# Patient Record
Sex: FEMALE | Race: BLACK OR AFRICAN AMERICAN | Employment: FULL TIME | URBAN - METROPOLITAN AREA
[De-identification: names, ages, dates, MRNs, and addresses within clinical notes are randomized per-mention and may not be internally consistent; named-entity substitution may affect disease eponyms.]

---

## 2023-05-04 ENCOUNTER — APPOINTMENT (OUTPATIENT)
Dept: PRIMARY CARE | Facility: CLINIC | Age: 43
End: 2023-05-04
Payer: COMMERCIAL

## 2023-05-15 ENCOUNTER — OFFICE VISIT (OUTPATIENT)
Dept: PRIMARY CARE | Facility: CLINIC | Age: 43
End: 2023-05-15
Payer: COMMERCIAL

## 2023-05-15 VITALS
WEIGHT: 208 LBS | SYSTOLIC BLOOD PRESSURE: 144 MMHG | HEART RATE: 77 BPM | OXYGEN SATURATION: 95 % | RESPIRATION RATE: 17 BRPM | DIASTOLIC BLOOD PRESSURE: 91 MMHG | BODY MASS INDEX: 38.28 KG/M2 | HEIGHT: 62 IN

## 2023-05-15 DIAGNOSIS — Z00.00 HEALTHCARE MAINTENANCE: ICD-10-CM

## 2023-05-15 DIAGNOSIS — R03.0 ELEVATED BP WITHOUT DIAGNOSIS OF HYPERTENSION: ICD-10-CM

## 2023-05-15 DIAGNOSIS — F41.9 ANXIETY AND DEPRESSION: ICD-10-CM

## 2023-05-15 DIAGNOSIS — L30.9 ECZEMA, UNSPECIFIED TYPE: Primary | ICD-10-CM

## 2023-05-15 DIAGNOSIS — E01.0 THYROMEGALY: ICD-10-CM

## 2023-05-15 DIAGNOSIS — F32.A ANXIETY AND DEPRESSION: ICD-10-CM

## 2023-05-15 PROCEDURE — 99386 PREV VISIT NEW AGE 40-64: CPT | Performed by: STUDENT IN AN ORGANIZED HEALTH CARE EDUCATION/TRAINING PROGRAM

## 2023-05-15 PROCEDURE — 1036F TOBACCO NON-USER: CPT | Performed by: STUDENT IN AN ORGANIZED HEALTH CARE EDUCATION/TRAINING PROGRAM

## 2023-05-15 RX ORDER — ACETAMINOPHEN 500 MG
1 TABLET ORAL DAILY
Qty: 1 KIT | Refills: 0 | Status: SHIPPED | OUTPATIENT
Start: 2023-05-15

## 2023-05-15 RX ORDER — TRIAMCINOLONE ACETONIDE 1 MG/G
CREAM TOPICAL 2 TIMES DAILY
Qty: 15 G | Refills: 0 | Status: SHIPPED | OUTPATIENT
Start: 2023-05-15

## 2023-05-15 ASSESSMENT — ENCOUNTER SYMPTOMS
WHEEZING: 0
CONSTIPATION: 0
ACTIVITY CHANGE: 0
ABDOMINAL PAIN: 0
HEMATURIA: 0
FEVER: 0
COUGH: 0
NAUSEA: 0
DIZZINESS: 0
WEAKNESS: 0
DYSURIA: 0
SPEECH DIFFICULTY: 0
NUMBNESS: 0
SHORTNESS OF BREATH: 0
VOMITING: 0

## 2023-05-15 NOTE — PROGRESS NOTES
"Subjective   Patient ID: Rocio Jenkins is a 42 y.o. female who presents for Establish Care and Annual Exam.  HPI  Here to establish care and for complete physical exam.  Denies any concerns today  Denies any past medical or surgical history.  Allergic to penicillin-hives  Cannot give us family history as she was brought up with her grandmother  Denies any smoking or recreational drug use.  Reports occasional alcohol.      Review of Systems   Constitutional:  Negative for activity change and fever.   HENT:  Negative for congestion.    Respiratory:  Negative for cough, shortness of breath and wheezing.    Cardiovascular:  Negative for chest pain and leg swelling.   Gastrointestinal:  Negative for abdominal pain, constipation, nausea and vomiting.   Endocrine: Negative for cold intolerance.   Genitourinary:  Negative for dysuria, hematuria and urgency.   Neurological:  Negative for dizziness, speech difficulty, weakness and numbness.   Psychiatric/Behavioral:  Negative for self-injury and suicidal ideas.        Objective   Visit Vitals  BP (!) 144/91 (BP Location: Left arm, Patient Position: Sitting)   Pulse 77   Resp 17   Ht 1.575 m (5' 2\")   Wt 94.3 kg (208 lb)   SpO2 95%   BMI 38.04 kg/m²   Smoking Status Never   BSA 2.03 m²      Physical Exam  HENT:      Head: Normocephalic and atraumatic.      Right Ear: Tympanic membrane normal.      Left Ear: Tympanic membrane normal.      Nose: Nose normal.      Mouth/Throat:      Mouth: Mucous membranes are moist.   Eyes:      Extraocular Movements: Extraocular movements intact.      Conjunctiva/sclera: Conjunctivae normal.      Pupils: Pupils are equal, round, and reactive to light.   Neck:      Thyroid: Thyromegaly present.   Cardiovascular:      Rate and Rhythm: Normal rate and regular rhythm.      Pulses: Normal pulses.      Heart sounds: Normal heart sounds.   Pulmonary:      Effort: Pulmonary effort is normal.      Breath sounds: Normal breath sounds. No stridor. No " rhonchi.   Abdominal:      General: Bowel sounds are normal.      Palpations: Abdomen is soft.      Tenderness: There is no abdominal tenderness. There is no guarding or rebound.   Musculoskeletal:      Cervical back: Neck supple.   Neurological:      Mental Status: She is oriented to person, place, and time.   Psychiatric:         Mood and Affect: Mood normal.         Behavior: Behavior normal.         Assessment/Plan     Problem List Items Addressed This Visit          Circulatory    Elevated BP without diagnosis of hypertension    Relevant Medications    blood pressure monitor (Blood Pressure Kit) kit     Other Visit Diagnoses       Eczema, unspecified type    -  Primary    Relevant Medications    triamcinolone (Kenalog) 0.1 % cream    Healthcare maintenance        Relevant Orders    CBC    Lipid Panel    Comprehensive Metabolic Panel    TSH with reflex to Free T4 if abnormal    BI mammo bilateral screening tomosynthesis    Referral to Gynecology    Hepatitis B Surface Antibody    Hepatitis B Surface Antigen    Hepatitis C Antibody    HIV 1/2 Antigen/Antibody Screen with Reflex to Confirmation    Thyromegaly        Relevant Orders    TSH with reflex to Free T4 if abnormal    US thyroid    Anxiety and depression        Relevant Orders    Referral to Psychology        Overall patient is here to establish care.  Reports ongoing anxiety and depression since 1 month.  Agreeable to try CBT for now.  Denies any suicidal or homicidal ideation.  Thyromegaly-no compressive symptoms endorsed.  We will get a thyroid ultrasound and thyroid blood work  Elevated blood pressure without diagnosis of hypertension-patient reports this is the first time she has had elevated blood pressure.  Advised low-salt diet, home blood pressure monitoring.  Patient to see me in 10 to 15 days with a blood pressure log and home blood pressure device  As per health maintenance we will get routine set of labs including metabolic panel and CBC,  agreeable to get HIV and hepatitis B.  Mammogram and gynecology referral placed.  Last tetanus shot in 2014 per records    Once about results are obtained we will call the patient with abnormal results if any and discuss further evaluation and management

## 2023-05-24 ENCOUNTER — LAB (OUTPATIENT)
Dept: LAB | Facility: LAB | Age: 43
End: 2023-05-24
Payer: COMMERCIAL

## 2023-05-24 DIAGNOSIS — Z00.00 HEALTHCARE MAINTENANCE: ICD-10-CM

## 2023-05-24 DIAGNOSIS — E01.0 THYROMEGALY: ICD-10-CM

## 2023-05-24 LAB
ALANINE AMINOTRANSFERASE (SGPT) (U/L) IN SER/PLAS: 9 U/L (ref 7–45)
ALBUMIN (G/DL) IN SER/PLAS: 3.8 G/DL (ref 3.4–5)
ALKALINE PHOSPHATASE (U/L) IN SER/PLAS: 58 U/L (ref 33–110)
ANION GAP IN SER/PLAS: 13 MMOL/L (ref 10–20)
ASPARTATE AMINOTRANSFERASE (SGOT) (U/L) IN SER/PLAS: 18 U/L (ref 9–39)
BILIRUBIN TOTAL (MG/DL) IN SER/PLAS: 0.5 MG/DL (ref 0–1.2)
CALCIUM (MG/DL) IN SER/PLAS: 9.3 MG/DL (ref 8.6–10.6)
CARBON DIOXIDE, TOTAL (MMOL/L) IN SER/PLAS: 26 MMOL/L (ref 21–32)
CHLORIDE (MMOL/L) IN SER/PLAS: 103 MMOL/L (ref 98–107)
CHOLESTEROL (MG/DL) IN SER/PLAS: 178 MG/DL (ref 0–199)
CHOLESTEROL IN HDL (MG/DL) IN SER/PLAS: 53.7 MG/DL
CHOLESTEROL/HDL RATIO: 3.3
CREATININE (MG/DL) IN SER/PLAS: 0.98 MG/DL (ref 0.5–1.05)
ERYTHROCYTE DISTRIBUTION WIDTH (RATIO) BY AUTOMATED COUNT: 12.3 % (ref 11.5–14.5)
ERYTHROCYTE MEAN CORPUSCULAR HEMOGLOBIN CONCENTRATION (G/DL) BY AUTOMATED: 30.7 G/DL (ref 32–36)
ERYTHROCYTE MEAN CORPUSCULAR VOLUME (FL) BY AUTOMATED COUNT: 96 FL (ref 80–100)
ERYTHROCYTES (10*6/UL) IN BLOOD BY AUTOMATED COUNT: 3.82 X10E12/L (ref 4–5.2)
GFR FEMALE: 73 ML/MIN/1.73M2
GLUCOSE (MG/DL) IN SER/PLAS: 95 MG/DL (ref 74–99)
HEMATOCRIT (%) IN BLOOD BY AUTOMATED COUNT: 36.8 % (ref 36–46)
HEMOGLOBIN (G/DL) IN BLOOD: 11.3 G/DL (ref 12–16)
HEPATITIS B VIRUS SURFACE AB (MIU/ML) IN SERUM: <3.1 MIU/ML
HEPATITIS B VIRUS SURFACE AG PRESENCE IN SERUM: NONREACTIVE
HEPATITIS C VIRUS AB PRESENCE IN SERUM: NONREACTIVE
HIV 1/ 2 AG/AB SCREEN: NONREACTIVE
LDL: 100 MG/DL (ref 0–99)
LEUKOCYTES (10*3/UL) IN BLOOD BY AUTOMATED COUNT: 6.7 X10E9/L (ref 4.4–11.3)
NRBC (PER 100 WBCS) BY AUTOMATED COUNT: 0 /100 WBC (ref 0–0)
PLATELETS (10*3/UL) IN BLOOD AUTOMATED COUNT: 305 X10E9/L (ref 150–450)
POTASSIUM (MMOL/L) IN SER/PLAS: 3.9 MMOL/L (ref 3.5–5.3)
PROTEIN TOTAL: 7.6 G/DL (ref 6.4–8.2)
SODIUM (MMOL/L) IN SER/PLAS: 138 MMOL/L (ref 136–145)
THYROTROPIN (MIU/L) IN SER/PLAS BY DETECTION LIMIT <= 0.05 MIU/L: 2.75 MIU/L (ref 0.44–3.98)
TRIGLYCERIDE (MG/DL) IN SER/PLAS: 124 MG/DL (ref 0–149)
UREA NITROGEN (MG/DL) IN SER/PLAS: 13 MG/DL (ref 6–23)
VLDL: 25 MG/DL (ref 0–40)

## 2023-05-24 PROCEDURE — 80053 COMPREHEN METABOLIC PANEL: CPT

## 2023-05-24 PROCEDURE — 87389 HIV-1 AG W/HIV-1&-2 AB AG IA: CPT

## 2023-05-24 PROCEDURE — 85027 COMPLETE CBC AUTOMATED: CPT

## 2023-05-24 PROCEDURE — 87340 HEPATITIS B SURFACE AG IA: CPT

## 2023-05-24 PROCEDURE — 84443 ASSAY THYROID STIM HORMONE: CPT

## 2023-05-24 PROCEDURE — 86803 HEPATITIS C AB TEST: CPT

## 2023-05-24 PROCEDURE — 86706 HEP B SURFACE ANTIBODY: CPT

## 2023-05-24 PROCEDURE — 36415 COLL VENOUS BLD VENIPUNCTURE: CPT

## 2023-05-24 PROCEDURE — 80061 LIPID PANEL: CPT

## 2023-06-02 ENCOUNTER — TELEPHONE (OUTPATIENT)
Dept: PRIMARY CARE | Facility: CLINIC | Age: 43
End: 2023-06-02
Payer: COMMERCIAL

## 2023-06-02 NOTE — TELEPHONE ENCOUNTER
Result Communication    Resulted Orders   CBC   Result Value Ref Range    WBC 6.7 4.4 - 11.3 x10E9/L    nRBC 0.0 0.0 - 0.0 /100 WBC    RBC 3.82 (L) 4.00 - 5.20 x10E12/L    Hemoglobin 11.3 (L) 12.0 - 16.0 g/dL    Hematocrit 36.8 36.0 - 46.0 %    MCV 96 80 - 100 fL    MCHC 30.7 (L) 32.0 - 36.0 g/dL    Platelets 305 150 - 450 x10E9/L    RDW 12.3 11.5 - 14.5 %   Lipid Panel   Result Value Ref Range    Cholesterol 178 0 - 199 mg/dL      Comment:      .      AGE      DESIRABLE   BORDERLINE HIGH   HIGH     0-19 Y     0 - 169       170 - 199     >/= 200    20-24 Y     0 - 189       190 - 224     >/= 225         >24 Y     0 - 199       200 - 239     >/= 240   **All ranges are based on fasting samples. Specific   therapeutic targets will vary based on patient-specific   cardiac risk.  .   Pediatric guidelines reference:Pediatrics 2011, 128(S5).   Adult guidelines reference: NCEP ATPIII Guidelines,     MARIA VICTORIA 2001, 258:2486-97  .   Venipuncture immediately after or during the    administration of Metamizole may lead to falsely   low results. Testing should be performed immediately   prior to Metamizole dosing.    HDL 53.7 mg/dL      Comment:      .      AGE      VERY LOW   LOW     NORMAL    HIGH       0-19 Y       < 35   < 40     40-45     ----    20-24 Y       ----   < 40       >45     ----      >24 Y       ----   < 40     40-60      >60  .    Cholesterol/HDL Ratio 3.3       Comment:      REF VALUES  DESIRABLE  < 3.4  HIGH RISK  > 5.0     (H) 0 - 99 mg/dL      Comment:      .                           NEAR      BORD      AGE      DESIRABLE  OPTIMAL    HIGH     HIGH     VERY HIGH     0-19 Y     0 - 109     ---    110-129   >/= 130     ----    20-24 Y     0 - 119     ---    120-159   >/= 160     ----      >24 Y     0 -  99   100-129  130-159   160-189     >/=190  .    VLDL 25 0 - 40 mg/dL    Triglycerides 124 0 - 149 mg/dL      Comment:      .      AGE      DESIRABLE   BORDERLINE HIGH   HIGH     VERY HIGH   0 D-90 D    19  - 174         ----         ----        ----  91 D- 9 Y     0 -  74        75 -  99     >/= 100      ----    10-19 Y     0 -  89        90 - 129     >/= 130      ----    20-24 Y     0 - 114       115 - 149     >/= 150      ----         >24 Y     0 - 149       150 - 199    200- 499    >/= 500  .   Venipuncture immediately after or during the    administration of Metamizole may lead to falsely   low results. Testing should be performed immediately   prior to Metamizole dosing.   Comprehensive Metabolic Panel   Result Value Ref Range    Glucose 95 74 - 99 mg/dL    Sodium 138 136 - 145 mmol/L    Potassium 3.9 3.5 - 5.3 mmol/L    Chloride 103 98 - 107 mmol/L    Bicarbonate 26 21 - 32 mmol/L    Anion Gap 13 10 - 20 mmol/L    Urea Nitrogen 13 6 - 23 mg/dL    Creatinine 0.98 0.50 - 1.05 mg/dL    GFR Female 73 >90 mL/min/1.73m2      Comment:       CALCULATIONS OF ESTIMATED GFR ARE PERFORMED   USING THE 2021 CKD-EPI STUDY REFIT EQUATION   WITHOUT THE RACE VARIABLE FOR THE IDMS-TRACEABLE   CREATININE METHODS.    https://jasn.asnjournals.org/content/early/2021/09/22/ASN.919785    Calcium 9.3 8.6 - 10.6 mg/dL    Albumin 3.8 3.4 - 5.0 g/dL    Alkaline Phosphatase 58 33 - 110 U/L    Total Protein 7.6 6.4 - 8.2 g/dL    AST 18 9 - 39 U/L    Total Bilirubin 0.5 0.0 - 1.2 mg/dL    ALT (SGPT) 9 7 - 45 U/L      Comment:       Patients treated with Sulfasalazine may generate    falsely decreased results for ALT.   TSH with reflex to Free T4 if abnormal   Result Value Ref Range    TSH 2.75 0.44 - 3.98 mIU/L      Comment:       TSH testing is performed using different testing    methodology at [facility] than at other    [facility] hospitals. Direct result comparisons should    only be made within the same method.   Hepatitis B Surface Antibody   Result Value Ref Range    Hepatitis B Surface Ab <3.1 <10 mIU/mL      Comment:      INTERPRETIVE CRITERIA:  <10 mIU/mL....NONREACTIVE    >=10 mIU/mL...REACTIVE   .   Biotin  interference may cause falsely decreased results.   Patients taking a Biotin dose of up to 5 mg/day should   refrain from taking Biotin for 24 hours before sample   collection. Providers may contact their local laboratory   for further information.   Hepatitis B Surface Antigen   Result Value Ref Range    Hepatitis B Surface Ag NONREACTIVE NONREACTIVE      Comment:       Biotin interference may cause falsely decreased results.   Patients taking a Biotin dose of up to 5 mg/day should   refrain from taking Biotin for 24 hours before sample   collection. Providers may contact their local laboratory   for further information.   Hepatitis C Antibody   Result Value Ref Range    Hepatitis C Ab NONREACTIVE NONREACTIVE      Comment:       Results from patients taking biotin supplements or receiving   high-dose biotin therapy should be interpreted with caution   due to possible interference with this test. Providers may    contact their local laboratory for further information.   HIV 1/2 Antigen/Antibody Screen with Reflex to Confirmation   Result Value Ref Range    HIV 1 and 2 Screen NONREACTIVE NONREACTIVE      Comment:       HIV Ag/Ab screen is performed using the Siemens [employer] MedicalllXanodyne   HIV Ag/Ab Combo assay which detects the presence of HIV    p24 antigen as well as antibodies to HIV-1   (Group M and O) and HIV-2.  .  No laboratory evidence of HIV infection. If acute HIV infection is   suspected, consider testing for HIV RNA by PCR (viral load).       11:21 AM    CAttempted to call patient about labs;    Sent an SMS notification   Awaiting a call back

## 2023-06-21 LAB
CHLAMYDIA TRACH., AMPLIFIED: NEGATIVE
CLUE CELLS: NORMAL
N. GONORRHEA, AMPLIFIED: NEGATIVE
NUGENT SCORE: 1
TRICHOMONAS VAGINALIS: NEGATIVE
YEAST: NORMAL

## 2024-03-27 ENCOUNTER — LAB (OUTPATIENT)
Dept: LAB | Facility: LAB | Age: 44
End: 2024-03-27
Payer: COMMERCIAL

## 2024-03-27 ENCOUNTER — OFFICE VISIT (OUTPATIENT)
Dept: PRIMARY CARE | Facility: CLINIC | Age: 44
End: 2024-03-27
Payer: COMMERCIAL

## 2024-03-27 VITALS
HEIGHT: 62 IN | BODY MASS INDEX: 38.05 KG/M2 | HEART RATE: 87 BPM | SYSTOLIC BLOOD PRESSURE: 136 MMHG | DIASTOLIC BLOOD PRESSURE: 87 MMHG | WEIGHT: 206.8 LBS | OXYGEN SATURATION: 95 %

## 2024-03-27 DIAGNOSIS — D64.9 MILD ANEMIA: ICD-10-CM

## 2024-03-27 DIAGNOSIS — Z76.89 ENCOUNTER TO ESTABLISH CARE: ICD-10-CM

## 2024-03-27 DIAGNOSIS — N76.0 ACUTE VAGINITIS: ICD-10-CM

## 2024-03-27 DIAGNOSIS — R94.31 ABNORMAL EKG: ICD-10-CM

## 2024-03-27 DIAGNOSIS — Z76.89 ENCOUNTER TO ESTABLISH CARE: Primary | ICD-10-CM

## 2024-03-27 DIAGNOSIS — Z00.00 ROUTINE GENERAL MEDICAL EXAMINATION AT A HEALTH CARE FACILITY: ICD-10-CM

## 2024-03-27 DIAGNOSIS — R07.89 ATYPICAL CHEST PAIN: ICD-10-CM

## 2024-03-27 LAB
ALBUMIN SERPL BCP-MCNC: 3.9 G/DL (ref 3.4–5)
ALP SERPL-CCNC: 59 U/L (ref 33–110)
ALT SERPL W P-5'-P-CCNC: 10 U/L (ref 7–45)
ANION GAP SERPL CALC-SCNC: 11 MMOL/L (ref 10–20)
AST SERPL W P-5'-P-CCNC: 17 U/L (ref 9–39)
BILIRUB SERPL-MCNC: 0.4 MG/DL (ref 0–1.2)
BUN SERPL-MCNC: 14 MG/DL (ref 6–23)
CALCIUM SERPL-MCNC: 9.5 MG/DL (ref 8.6–10.6)
CHLORIDE SERPL-SCNC: 104 MMOL/L (ref 98–107)
CHOLEST SERPL-MCNC: 211 MG/DL (ref 0–199)
CHOLESTEROL/HDL RATIO: 4.1
CO2 SERPL-SCNC: 29 MMOL/L (ref 21–32)
CREAT SERPL-MCNC: 0.91 MG/DL (ref 0.5–1.05)
EGFRCR SERPLBLD CKD-EPI 2021: 79 ML/MIN/1.73M*2
ERYTHROCYTE [DISTWIDTH] IN BLOOD BY AUTOMATED COUNT: 12.6 % (ref 11.5–14.5)
EST. AVERAGE GLUCOSE BLD GHB EST-MCNC: 103 MG/DL
GLUCOSE SERPL-MCNC: 99 MG/DL (ref 74–99)
HBA1C MFR BLD: 5.2 %
HCT VFR BLD AUTO: 36.6 % (ref 36–46)
HDLC SERPL-MCNC: 51.9 MG/DL
HGB BLD-MCNC: 11.3 G/DL (ref 12–16)
LDLC SERPL CALC-MCNC: 128 MG/DL
MCH RBC QN AUTO: 29.7 PG (ref 26–34)
MCHC RBC AUTO-ENTMCNC: 30.9 G/DL (ref 32–36)
MCV RBC AUTO: 96 FL (ref 80–100)
NON HDL CHOLESTEROL: 159 MG/DL (ref 0–149)
NRBC BLD-RTO: 0 /100 WBCS (ref 0–0)
PLATELET # BLD AUTO: 309 X10*3/UL (ref 150–450)
POTASSIUM SERPL-SCNC: 3.8 MMOL/L (ref 3.5–5.3)
PROT SERPL-MCNC: 7.3 G/DL (ref 6.4–8.2)
RBC # BLD AUTO: 3.81 X10*6/UL (ref 4–5.2)
SODIUM SERPL-SCNC: 140 MMOL/L (ref 136–145)
TRIGL SERPL-MCNC: 157 MG/DL (ref 0–149)
TSH SERPL-ACNC: 1.43 MIU/L (ref 0.44–3.98)
VLDL: 31 MG/DL (ref 0–40)
WBC # BLD AUTO: 4.8 X10*3/UL (ref 4.4–11.3)

## 2024-03-27 PROCEDURE — 82607 VITAMIN B-12: CPT

## 2024-03-27 PROCEDURE — 83021 HEMOGLOBIN CHROMOTOGRAPHY: CPT

## 2024-03-27 PROCEDURE — 83020 HEMOGLOBIN ELECTROPHORESIS: CPT | Performed by: STUDENT IN AN ORGANIZED HEALTH CARE EDUCATION/TRAINING PROGRAM

## 2024-03-27 PROCEDURE — 83036 HEMOGLOBIN GLYCOSYLATED A1C: CPT

## 2024-03-27 PROCEDURE — 80053 COMPREHEN METABOLIC PANEL: CPT

## 2024-03-27 PROCEDURE — 85027 COMPLETE CBC AUTOMATED: CPT

## 2024-03-27 PROCEDURE — 80061 LIPID PANEL: CPT

## 2024-03-27 PROCEDURE — 93000 ELECTROCARDIOGRAM COMPLETE: CPT | Performed by: STUDENT IN AN ORGANIZED HEALTH CARE EDUCATION/TRAINING PROGRAM

## 2024-03-27 PROCEDURE — 1036F TOBACCO NON-USER: CPT | Performed by: STUDENT IN AN ORGANIZED HEALTH CARE EDUCATION/TRAINING PROGRAM

## 2024-03-27 PROCEDURE — 82746 ASSAY OF FOLIC ACID SERUM: CPT

## 2024-03-27 PROCEDURE — 83540 ASSAY OF IRON: CPT

## 2024-03-27 PROCEDURE — 36415 COLL VENOUS BLD VENIPUNCTURE: CPT

## 2024-03-27 PROCEDURE — 83550 IRON BINDING TEST: CPT

## 2024-03-27 PROCEDURE — 84443 ASSAY THYROID STIM HORMONE: CPT

## 2024-03-27 PROCEDURE — 99214 OFFICE O/P EST MOD 30 MIN: CPT | Performed by: STUDENT IN AN ORGANIZED HEALTH CARE EDUCATION/TRAINING PROGRAM

## 2024-03-27 RX ORDER — FLUCONAZOLE 150 MG/1
150 TABLET ORAL ONCE
Qty: 1 TABLET | Refills: 0 | Status: SHIPPED | OUTPATIENT
Start: 2024-03-27 | End: 2024-03-27

## 2024-03-27 NOTE — PROGRESS NOTES
"Subjective   Patient ID: Rocio Jenkins is a 43 y.o. female who presents for New Patient Visit (Establish care. Discuss chest issues from a month ago. Pt declined flu shot. ).        HPI    Est care   Pt's PMH, PSH, SH, FH , meds and allergies was obtained / reviewed and updated .    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 No recent infection   She notices an odor       Visit Vitals  /87   Pulse 87   Ht 1.575 m (5' 2\")   Wt 93.8 kg (206 lb 12.8 oz)   LMP 03/15/2024   SpO2 95%   BMI 37.82 kg/m²   Smoking Status Never   BSA 2.03 m²      Patient's last menstrual period was 03/15/2024.     Review of Systems    Constitutional : No feeling poorly / fevers/ chills / night sweats/ fatigue   Cardiovascular : No CP /Palpitations/ lower extremity edema / syncope   Respiratory : No Cough /PRIDE/Dyspnea at rest   Gastrointestinal : No abd pain / N/V  No bloody stools/ melena / constipation  Endo : No polyuria/polydipsia/ muscle weakness / sluggishness   CNS: No confusion / HA/ tingling/ numbness/ weakness of extremities  Psychiatric: No anxiety/ depression/ SI/HI    All other systems have been reviewed and are negative for complaint       Physical Exam    Constitutional : Vitals reviewed. Alert and in no distress  Cardiovascular : RRR, Normal S1, S2, No pericardial rub/ gallop, no peripheral edema   Pulmonary: No respiratory distress, CTAB   MSK : Normal gait and station , strength and tone     Neurologic : CNs 2-12 grossly intact , no obvious FNDs  Psych : A,Ox3, normal mood and affect      Assessment/Plan   Diagnoses and all orders for this visit:  Encounter to establish care  -     Hemoglobin A1C; Future  -     CBC; Future  -     Comprehensive Metabolic " Panel; Future  -     Lipid Panel; Future  -     TSH with reflex to Free T4 if abnormal; Future  Routine general medical examination at a health care facility  Acute vaginitis  -     fluconazole (Diflucan) 150 mg tablet; Take 1 tablet (150 mg) by mouth 1 time for 1 dose.  Atypical chest pain  -     ECG 12 lead (Clinic Performed)  Abnormal EKG  -     Referral to Cardiology; Future      44 y/o female presents to est care with concerns as noted in HPI     # Atypical chest pain   EKG obtained today , abnormal , cardiology referral for further eval   Cardiac risk factors discussed   FH unknown   Labs as above   Declined cardiac stress test     # Discussed vaginal candidiasis and BV   Empiric rx for vaginal candidiasis  sent   Recommended testing for definitive diagnosis   Pt to RTO for testing if having sx or see gyn         Conditions addressed and mgmt as noted above.  Pertinent labs, images/ imaging reports , chart review was done .   Age appropriate labs / labs for mgmt of chronic medical conditions ordered, further mgmt pending the results.

## 2024-03-28 DIAGNOSIS — D64.9 MILD ANEMIA: Primary | ICD-10-CM

## 2024-03-28 LAB
FOLATE SERPL-MCNC: 18.5 NG/ML
IRON SATN MFR SERPL: 17 % (ref 25–45)
IRON SERPL-MCNC: 75 UG/DL (ref 35–150)
TIBC SERPL-MCNC: 436 UG/DL (ref 240–445)
UIBC SERPL-MCNC: 361 UG/DL (ref 110–370)
VIT B12 SERPL-MCNC: 390 PG/ML (ref 211–911)

## 2024-03-29 LAB
HEMOGLOBIN A2: 2.8 % (ref 2–3.5)
HEMOGLOBIN A: 95.7 % (ref 95.8–98)
HEMOGLOBIN F: 1.5 % (ref 0–2)
HEMOGLOBIN IDENTIFICATION INTERPRETATION: NORMAL
PATH REVIEW-HGB IDENTIFICATION: ABNORMAL

## 2024-04-03 ENCOUNTER — APPOINTMENT (OUTPATIENT)
Dept: PRIMARY CARE | Facility: CLINIC | Age: 44
End: 2024-04-03
Payer: COMMERCIAL

## 2024-05-20 ENCOUNTER — APPOINTMENT (OUTPATIENT)
Dept: CARDIOLOGY | Facility: CLINIC | Age: 44
End: 2024-05-20
Payer: COMMERCIAL

## 2024-05-21 ENCOUNTER — APPOINTMENT (OUTPATIENT)
Dept: OBSTETRICS AND GYNECOLOGY | Facility: CLINIC | Age: 44
End: 2024-05-21
Payer: COMMERCIAL

## 2024-05-31 ENCOUNTER — APPOINTMENT (OUTPATIENT)
Dept: OBSTETRICS AND GYNECOLOGY | Facility: HOSPITAL | Age: 44
End: 2024-05-31
Payer: COMMERCIAL

## 2024-06-10 ENCOUNTER — APPOINTMENT (OUTPATIENT)
Dept: CARDIOLOGY | Facility: CLINIC | Age: 44
End: 2024-06-10
Payer: COMMERCIAL

## 2024-06-17 ENCOUNTER — APPOINTMENT (OUTPATIENT)
Dept: CARDIOLOGY | Facility: CLINIC | Age: 44
End: 2024-06-17
Payer: COMMERCIAL

## 2024-06-21 ENCOUNTER — APPOINTMENT (OUTPATIENT)
Dept: OBSTETRICS AND GYNECOLOGY | Facility: HOSPITAL | Age: 44
End: 2024-06-21
Payer: COMMERCIAL

## 2024-07-01 ENCOUNTER — LAB REQUISITION (OUTPATIENT)
Dept: LAB | Facility: HOSPITAL | Age: 44
End: 2024-07-01
Payer: COMMERCIAL

## 2024-07-01 DIAGNOSIS — R35.0 FREQUENCY OF MICTURITION: ICD-10-CM

## 2024-07-01 PROCEDURE — 87086 URINE CULTURE/COLONY COUNT: CPT

## 2024-07-03 ENCOUNTER — APPOINTMENT (OUTPATIENT)
Dept: CARDIOLOGY | Facility: CLINIC | Age: 44
End: 2024-07-03
Payer: COMMERCIAL

## 2024-07-03 LAB — BACTERIA UR CULT: NORMAL

## 2024-10-03 ENCOUNTER — APPOINTMENT (OUTPATIENT)
Dept: PRIMARY CARE | Facility: CLINIC | Age: 44
End: 2024-10-03
Payer: COMMERCIAL

## 2024-10-03 DIAGNOSIS — I10 PRIMARY HYPERTENSION: ICD-10-CM

## 2024-10-03 DIAGNOSIS — Z12.11 COLON CANCER SCREENING: ICD-10-CM

## 2024-10-03 DIAGNOSIS — Z12.31 VISIT FOR SCREENING MAMMOGRAM: ICD-10-CM

## 2024-10-03 DIAGNOSIS — Z00.00 ROUTINE GENERAL MEDICAL EXAMINATION AT A HEALTH CARE FACILITY: Primary | ICD-10-CM

## 2024-10-03 PROCEDURE — 3008F BODY MASS INDEX DOCD: CPT | Performed by: FAMILY MEDICINE

## 2024-10-03 PROCEDURE — 3075F SYST BP GE 130 - 139MM HG: CPT | Performed by: FAMILY MEDICINE

## 2024-10-03 PROCEDURE — 99386 PREV VISIT NEW AGE 40-64: CPT | Performed by: FAMILY MEDICINE

## 2024-10-03 PROCEDURE — 3079F DIAST BP 80-89 MM HG: CPT | Performed by: FAMILY MEDICINE

## 2024-10-03 RX ORDER — AMLODIPINE BESYLATE 5 MG/1
5 TABLET ORAL DAILY
Qty: 90 TABLET | Refills: 1 | Status: SHIPPED | OUTPATIENT
Start: 2024-10-03 | End: 2025-04-01

## 2024-10-03 ASSESSMENT — PATIENT HEALTH QUESTIONNAIRE - PHQ9
4. FEELING TIRED OR HAVING LITTLE ENERGY: SEVERAL DAYS
10. IF YOU CHECKED OFF ANY PROBLEMS, HOW DIFFICULT HAVE THESE PROBLEMS MADE IT FOR YOU TO DO YOUR WORK, TAKE CARE OF THINGS AT HOME, OR GET ALONG WITH OTHER PEOPLE: SOMEWHAT DIFFICULT
7. TROUBLE CONCENTRATING ON THINGS, SUCH AS READING THE NEWSPAPER OR WATCHING TELEVISION: SEVERAL DAYS
SUM OF ALL RESPONSES TO PHQ9 QUESTIONS 1 AND 2: 4
9. THOUGHTS THAT YOU WOULD BE BETTER OFF DEAD, OR OF HURTING YOURSELF: NOT AT ALL
2. FEELING DOWN, DEPRESSED OR HOPELESS: MORE THAN HALF THE DAYS
5. POOR APPETITE OR OVEREATING: NOT AT ALL
SUM OF ALL RESPONSES TO PHQ QUESTIONS 1-9: 7
1. LITTLE INTEREST OR PLEASURE IN DOING THINGS: MORE THAN HALF THE DAYS
6. FEELING BAD ABOUT YOURSELF - OR THAT YOU ARE A FAILURE OR HAVE LET YOURSELF OR YOUR FAMILY DOWN: NOT AT ALL
3. TROUBLE FALLING OR STAYING ASLEEP OR SLEEPING TOO MUCH: NOT AT ALL
8. MOVING OR SPEAKING SO SLOWLY THAT OTHER PEOPLE COULD HAVE NOTICED. OR THE OPPOSITE, BEING SO FIGETY OR RESTLESS THAT YOU HAVE BEEN MOVING AROUND A LOT MORE THAN USUAL: SEVERAL DAYS

## 2024-10-03 NOTE — PROGRESS NOTES
"Subjective   Patient ID: Rocio Jenkins is a 43 y.o. female who presents for New Patient Visit (Est Care/CPE) and Annual Exam.      HPI  Patint is here for CPE  Needs mammo colon screen  Wants GYN reefral  Labs done in April   Current Outpatient Medications on File Prior to Visit   Medication Sig Dispense Refill    blood pressure monitor (Blood Pressure Kit) kit 1 kit once daily. 1 kit 0    triamcinolone (Kenalog) 0.1 % cream Apply topically 2 times a day. Apply to affected area 1-2 times daily as needed. 15 g 0     No current facility-administered medications on file prior to visit.        Review of Systems   Constitutional:  Negative for chills and fever.   HENT: Negative.     Respiratory: Negative.     Cardiovascular: Negative.    Gastrointestinal: Negative.  Negative for nausea and vomiting.   Endocrine: Negative.    Genitourinary: Negative.    Musculoskeletal:  Positive for arthralgias.   Skin: Negative.  Negative for rash.   Allergic/Immunologic: Negative.    Neurological: Negative.    Hematological: Negative.    Psychiatric/Behavioral: Negative.     All other systems reviewed and are negative.      Objective   /89   Pulse 81   Resp 16   Ht 1.575 m (5' 2\")   Wt 97.7 kg (215 lb 6.4 oz)   LMP 09/27/2024   SpO2 96%   BMI 39.40 kg/m²   BSA: 2.07 meters squared  Growth percentiles: Facility age limit for growth %jyoti is 20 years. Facility age limit for growth %jyoti is 20 years.   No visits with results within 1 Week(s) from this visit.   Latest known visit with results is:   Lab Requisition on 07/01/2024   Component Date Value Ref Range Status    Urine Culture 07/01/2024 Normal genitourinary jeannie   Final      Physical Exam  Vitals and nursing note reviewed.   Constitutional:       Appearance: Normal appearance.   HENT:      Head: Normocephalic and atraumatic.      Right Ear: Tympanic membrane normal.      Left Ear: Tympanic membrane normal.      Nose: Nose normal.      Mouth/Throat:      Mouth: " Mucous membranes are moist.   Eyes:      Pupils: Pupils are equal, round, and reactive to light.   Cardiovascular:      Rate and Rhythm: Normal rate and regular rhythm.      Pulses: Normal pulses.      Heart sounds: Normal heart sounds.   Pulmonary:      Effort: Pulmonary effort is normal.      Breath sounds: Normal breath sounds.   Abdominal:      General: Abdomen is flat. Bowel sounds are normal.      Palpations: Abdomen is soft.   Musculoskeletal:         General: Tenderness present.      Cervical back: Normal range of motion and neck supple.   Skin:     General: Skin is warm and dry.      Capillary Refill: Capillary refill takes less than 2 seconds.   Neurological:      General: No focal deficit present.      Mental Status: She is alert and oriented to person, place, and time.   Psychiatric:         Mood and Affect: Mood normal.         Assessment/Plan   Problem List Items Addressed This Visit             ICD-10-CM    Routine general medical examination at a health care facility - Primary Z00.00    Visit for screening mammogram Z12.31    Relevant Orders    BI mammo bilateral screening tomosynthesis    Primary hypertension I10     What is High Blood Pressure? High blood pressure (also called hypertension) is when your blood moves through your arteries at a higher pressure than normal and that forces your heart to work harder to pump the blood.     What are the Complications of High Blood Pressure? When your blood pressure gets too high or stays high for a long time, it can cause health problems such as:    Kidney disease or kidney failure   Heart attack and heart failure   Stroke   Vision problems   Circulation problems, poor blood supply to the legs    How are the causes of High Blood Pressure? There are many different causes and your provider can help you find out what might be causing your pressure to be too high.     What are the Signs of High Blood Pressure? High blood pressure doesn’t usually have warning  signs or symptoms, so many people don’t realize they have it and that is why regular monitoring is important.     Prevention and Treatment: Often high blood pressure can be controlled with lifestyle changes and when necessary, medications. Adopting heart healthy habits can help:      Maintain a Healthy Weight   Eat a heart healthy diet full of vegetables, fruits and whole grains that are high in fiber   Be Physically Active every day   Find heart healthy ways to reduce stress and increase relaxation    Don’t use tobacco products   Limit your intake of alcohol, caffeine and salt   Monitor your blood pressure regularly: ask your provider how often   Take your medications as prescribed, even when you’re feeling well   Strt amlodipine,          Relevant Medications    amLODIPine (Norvasc) 5 mg tablet    Colon cancer screening Z12.11    Relevant Orders    Cologuard® colon cancer screening     Refer to gyn  Follow up in 2 months for BP check

## 2024-10-20 VITALS
HEIGHT: 62 IN | SYSTOLIC BLOOD PRESSURE: 138 MMHG | WEIGHT: 215.4 LBS | HEART RATE: 81 BPM | BODY MASS INDEX: 39.64 KG/M2 | DIASTOLIC BLOOD PRESSURE: 89 MMHG | RESPIRATION RATE: 16 BRPM | OXYGEN SATURATION: 96 %

## 2024-10-20 PROBLEM — Z12.11 COLON CANCER SCREENING: Status: ACTIVE | Noted: 2024-10-20

## 2024-10-20 PROBLEM — I10 PRIMARY HYPERTENSION: Status: ACTIVE | Noted: 2024-10-20

## 2024-10-20 PROBLEM — Z12.31 VISIT FOR SCREENING MAMMOGRAM: Status: ACTIVE | Noted: 2024-10-20

## 2024-10-20 PROBLEM — Z00.00 ROUTINE GENERAL MEDICAL EXAMINATION AT A HEALTH CARE FACILITY: Status: ACTIVE | Noted: 2024-10-20

## 2024-10-20 ASSESSMENT — ENCOUNTER SYMPTOMS
NAUSEA: 0
GASTROINTESTINAL NEGATIVE: 1
CHILLS: 0
VOMITING: 0
ENDOCRINE NEGATIVE: 1
NEUROLOGICAL NEGATIVE: 1
ALLERGIC/IMMUNOLOGIC NEGATIVE: 1
PSYCHIATRIC NEGATIVE: 1
RESPIRATORY NEGATIVE: 1
FEVER: 0
HEMATOLOGIC/LYMPHATIC NEGATIVE: 1
CARDIOVASCULAR NEGATIVE: 1
ARTHRALGIAS: 1

## 2024-10-20 NOTE — ASSESSMENT & PLAN NOTE
What is High Blood Pressure? High blood pressure (also called hypertension) is when your blood moves through your arteries at a higher pressure than normal and that forces your heart to work harder to pump the blood.     What are the Complications of High Blood Pressure? When your blood pressure gets too high or stays high for a long time, it can cause health problems such as:    Kidney disease or kidney failure   Heart attack and heart failure   Stroke   Vision problems   Circulation problems, poor blood supply to the legs    How are the causes of High Blood Pressure? There are many different causes and your provider can help you find out what might be causing your pressure to be too high.     What are the Signs of High Blood Pressure? High blood pressure doesn’t usually have warning signs or symptoms, so many people don’t realize they have it and that is why regular monitoring is important.     Prevention and Treatment: Often high blood pressure can be controlled with lifestyle changes and when necessary, medications. Adopting heart healthy habits can help:      Maintain a Healthy Weight   Eat a heart healthy diet full of vegetables, fruits and whole grains that are high in fiber   Be Physically Active every day   Find heart healthy ways to reduce stress and increase relaxation    Don’t use tobacco products   Limit your intake of alcohol, caffeine and salt   Monitor your blood pressure regularly: ask your provider how often   Take your medications as prescribed, even when you’re feeling well   Strt amlodipine,

## 2024-11-01 LAB — NONINV COLON CA DNA+OCC BLD SCRN STL QL: NEGATIVE

## 2024-11-12 ENCOUNTER — APPOINTMENT (OUTPATIENT)
Dept: OBSTETRICS AND GYNECOLOGY | Facility: CLINIC | Age: 44
End: 2024-11-12
Payer: COMMERCIAL

## 2024-12-27 ENCOUNTER — OFFICE VISIT (OUTPATIENT)
Dept: URGENT CARE | Age: 44
End: 2024-12-27
Payer: COMMERCIAL

## 2024-12-27 ENCOUNTER — APPOINTMENT (OUTPATIENT)
Dept: URGENT CARE | Age: 44
End: 2024-12-27

## 2024-12-27 ENCOUNTER — APPOINTMENT (OUTPATIENT)
Dept: URGENT CARE | Age: 44
End: 2024-12-27
Payer: COMMERCIAL

## 2024-12-27 VITALS
TEMPERATURE: 97.2 F | HEART RATE: 90 BPM | DIASTOLIC BLOOD PRESSURE: 82 MMHG | RESPIRATION RATE: 16 BRPM | OXYGEN SATURATION: 96 % | SYSTOLIC BLOOD PRESSURE: 142 MMHG

## 2024-12-27 DIAGNOSIS — N89.8 VAGINAL DISCHARGE: Primary | ICD-10-CM

## 2024-12-27 PROCEDURE — 87491 CHLMYD TRACH DNA AMP PROBE: CPT

## 2024-12-27 PROCEDURE — 87661 TRICHOMONAS VAGINALIS AMPLIF: CPT

## 2024-12-27 PROCEDURE — 87591 N.GONORRHOEAE DNA AMP PROB: CPT

## 2024-12-27 PROCEDURE — 87205 SMEAR GRAM STAIN: CPT

## 2024-12-27 RX ORDER — FLUCONAZOLE 150 MG/1
150 TABLET ORAL SEE ADMIN INSTRUCTIONS
Qty: 2 TABLET | Refills: 0 | Status: SHIPPED | OUTPATIENT
Start: 2024-12-27 | End: 2024-12-28

## 2024-12-27 NOTE — PROGRESS NOTES
Subjective   Patient ID: Rocio Jenkins is a 44 y.o. female. They present today with a chief complaint of Vaginal Discharge (2 days x discharge and itching ).    History of Present Illness  Rocio Jenkins is a 44 y.o. female who presents to the clinic for couple days of vaginal discharge.  And itching.  Patient states the discharge is white in color.  Patient not take anything over-the-counter for the discomfort. Pt denies any chest pain, sob, N/V at this time in clinic.             Past Medical History  Allergies as of 12/27/2024 - Reviewed 12/27/2024   Allergen Reaction Noted    Penicillins Rash 08/05/2005       (Not in a hospital admission)       Past Medical History:   Diagnosis Date    Eczema        Past Surgical History:   Procedure Laterality Date    OTHER SURGICAL HISTORY  07/11/2019    No history of surgery        reports that she has never smoked. She has never been exposed to tobacco smoke. She has never used smokeless tobacco. She reports that she does not currently use alcohol. She reports that she does not currently use drugs.    Review of Systems  Review of Systems   Genitourinary:  Positive for vaginal discharge.   All other systems reviewed and are negative.                                 Objective    Vitals:    12/27/24 1449   BP: 142/82   Pulse: 90   Resp: 16   Temp: 36.2 °C (97.2 °F)   SpO2: 96%     No LMP recorded.    Physical Exam  Constitutional:       Appearance: Normal appearance.   Abdominal:      General: Abdomen is flat. Bowel sounds are normal. There is no distension.      Palpations: Abdomen is soft. There is no mass.      Tenderness: There is no abdominal tenderness. There is no right CVA tenderness, left CVA tenderness, guarding or rebound.   Neurological:      General: No focal deficit present.      Mental Status: She is alert and oriented to person, place, and time. Mental status is at baseline.   Psychiatric:         Mood and Affect: Mood normal.         Behavior:  Behavior normal.         Procedures    Point of Care Test & Imaging Results from this visit  No results found for this visit on 12/27/24.   No results found.    Diagnostic study results (if any) were reviewed by JESSICA Chicas.    Assessment/Plan   Allergies, medications, history, and pertinent labs/EKGs/Imaging reviewed by JESSICA Chicas.     Medical Decision Making  Concern for BV versus yeast versus STI.  Will send Diflucan to pharmacy.  Patient states she gets yeast infections before her menstrual cycle each month.  Patient does not have OB/GYN.  Will refer patient to OB/GYN at this visit.  Advised patient to call referral telephone number to get appointment set.  Advised patient if worsening symptoms to go to the emergency department for further evaluation.  Patient verbalized understanding.    Orders and Diagnoses  Diagnoses and all orders for this visit:  Vaginal discharge  -     fluconazole (Diflucan) 150 mg tablet; Take 1 tablet (150 mg) by mouth see administration instructions for 1 day. Take one tab now. Repeat in 7 days if symptoms persist.  -     Referral to Obstetrics / Gynecology; Future  -     Vaginitis Gram Stain For Bacterial Vaginosis + Yeast  -     C. trachomatis / N. gonorrhoeae, Amplified  -     Trichomonas vaginalis, Amplified      Medical Admin Record      Patient disposition: Home    Electronically signed by JESSICA Chicas  3:09 PM

## 2024-12-27 NOTE — PATIENT INSTRUCTIONS
Diflucan ordered.  Send out for BV/yeast-if positive will start antibiotic therapy.  [facility] send out for gonorrhea chlamydia and trichomonas-if positive we will call and start antibiotics.  Referral for OB/GYN placed.  Please call referral line.  If worsening symptoms, please go to the emergency room for further evaluation.    Please follow up with your primary provider within one week if symptoms do not improve.  You may schedule an appointment online at [email or web address] or call [phone]. Go to the Emergency Department if symptoms significantly worsen or if you develop chest pain or shortness of breath.

## 2024-12-28 LAB
C TRACH RRNA SPEC QL NAA+PROBE: NEGATIVE
CLUE CELLS VAG LPF-#/AREA: NORMAL /[LPF]
N GONORRHOEA DNA SPEC QL PROBE+SIG AMP: NEGATIVE
NUGENT SCORE: 0
T VAGINALIS RRNA SPEC QL NAA+PROBE: NEGATIVE
YEAST VAG WET PREP-#/AREA: NORMAL

## 2025-01-14 ENCOUNTER — APPOINTMENT (OUTPATIENT)
Dept: OBSTETRICS AND GYNECOLOGY | Facility: CLINIC | Age: 45
End: 2025-01-14
Payer: COMMERCIAL

## 2025-01-14 VITALS
HEIGHT: 63 IN | WEIGHT: 218 LBS | DIASTOLIC BLOOD PRESSURE: 74 MMHG | BODY MASS INDEX: 38.62 KG/M2 | SYSTOLIC BLOOD PRESSURE: 132 MMHG

## 2025-01-14 DIAGNOSIS — Z01.419 WELL WOMAN EXAM: Primary | ICD-10-CM

## 2025-01-14 DIAGNOSIS — Z01.419 ENCOUNTER FOR ANNUAL ROUTINE GYNECOLOGICAL EXAMINATION: ICD-10-CM

## 2025-01-14 DIAGNOSIS — Z12.31 VISIT FOR SCREENING MAMMOGRAM: ICD-10-CM

## 2025-01-14 PROCEDURE — 3078F DIAST BP <80 MM HG: CPT | Performed by: OBSTETRICS & GYNECOLOGY

## 2025-01-14 PROCEDURE — 99386 PREV VISIT NEW AGE 40-64: CPT | Performed by: OBSTETRICS & GYNECOLOGY

## 2025-01-14 PROCEDURE — 3008F BODY MASS INDEX DOCD: CPT | Performed by: OBSTETRICS & GYNECOLOGY

## 2025-01-14 PROCEDURE — 87624 HPV HI-RISK TYP POOLED RSLT: CPT

## 2025-01-14 PROCEDURE — 88175 CYTOPATH C/V AUTO FLUID REDO: CPT

## 2025-01-14 PROCEDURE — 3075F SYST BP GE 130 - 139MM HG: CPT | Performed by: OBSTETRICS & GYNECOLOGY

## 2025-01-14 ASSESSMENT — ENCOUNTER SYMPTOMS
HEMATOLOGIC/LYMPHATIC NEGATIVE: 0
CONSTITUTIONAL NEGATIVE: 0
RESPIRATORY NEGATIVE: 0
PSYCHIATRIC NEGATIVE: 0
GASTROINTESTINAL NEGATIVE: 0
ENDOCRINE NEGATIVE: 0
MUSCULOSKELETAL NEGATIVE: 0
EYES NEGATIVE: 0
CARDIOVASCULAR NEGATIVE: 0
ALLERGIC/IMMUNOLOGIC NEGATIVE: 0
NEUROLOGICAL NEGATIVE: 0

## 2025-01-14 ASSESSMENT — PAIN SCALES - GENERAL: PAINLEVEL_OUTOF10: 0-NO PAIN

## 2025-01-14 NOTE — PROGRESS NOTES
Subjective   Patient ID: Rocio Jenkins is a 44 y.o. female who presents for Annual Exam.  Pt needs a PAP and  mammogram   Pt is a  and has questions about fertility    Pt has irregular menses         Review of Systems   All other systems reviewed and are negative.      Objective   Physical Exam  Constitutional:       Appearance: Normal appearance. She is obese.   Chest:   Breasts:     Right: Normal.      Left: Normal.   Skin:     General: Skin is warm.   Neurological:      Mental Status: She is alert.         Assessment/Plan   Diagnoses and all orders for this visit:  Well woman exam  -     Referral to Primary Care; Future  Encounter for annual routine gynecological examination  -     THINPREP PAP  -     HPV DNA High Risk With Genotype  Visit for screening mammogram  -     BI mammo bilateral screening tomosynthesis; Future           Rochelle Ahuja MD 25 3:30 PM

## 2025-01-29 ENCOUNTER — HOSPITAL ENCOUNTER (OUTPATIENT)
Dept: RADIOLOGY | Facility: CLINIC | Age: 45
Discharge: HOME | End: 2025-01-29
Payer: COMMERCIAL

## 2025-01-29 VITALS — WEIGHT: 218 LBS | HEIGHT: 63 IN | BODY MASS INDEX: 38.62 KG/M2

## 2025-01-29 DIAGNOSIS — Z12.31 VISIT FOR SCREENING MAMMOGRAM: ICD-10-CM

## 2025-01-29 PROCEDURE — 77067 SCR MAMMO BI INCL CAD: CPT | Performed by: RADIOLOGY

## 2025-01-29 PROCEDURE — 77063 BREAST TOMOSYNTHESIS BI: CPT | Performed by: RADIOLOGY

## 2025-01-29 PROCEDURE — 77067 SCR MAMMO BI INCL CAD: CPT

## 2025-02-19 ENCOUNTER — TELEMEDICINE (OUTPATIENT)
Dept: PRIMARY CARE | Facility: CLINIC | Age: 45
End: 2025-02-19
Payer: COMMERCIAL

## 2025-02-19 DIAGNOSIS — B37.31: Primary | ICD-10-CM

## 2025-02-19 PROCEDURE — 99213 OFFICE O/P EST LOW 20 MIN: CPT

## 2025-02-19 RX ORDER — FLUCONAZOLE 150 MG/1
150 TABLET ORAL ONCE
Qty: 1 TABLET | Refills: 0 | Status: SHIPPED | OUTPATIENT
Start: 2025-02-19 | End: 2025-02-19

## 2025-02-19 RX ORDER — FLUCONAZOLE 150 MG/1
150 TABLET ORAL ONCE
Qty: 2 TABLET | Refills: 0 | Status: SHIPPED | OUTPATIENT
Start: 2025-02-19 | End: 2025-02-19

## 2025-02-19 NOTE — PROGRESS NOTES
On Demand Virtual Visit Patient Consent     This visit was completed via video conference. All issues as below were discussed and addressed but no physical exam was performed. If it was felt that the patient should be evaluated in clinic than they were directed there. The patient verbally consented to the visit.    An interactive audio and video telecommunication system which permits real time communications between the patient (at the originating site) and provider (at the distant site) was utilized to provide this telehealth service.   Verbal consent was requested and obtained from Rocio Jenkins (or parent if under 18) 02/19/25 for a telehealth visit.   I have verbally confirmed with Rocio Jenkins (or parent if under 18) that they are physically located in the [facility] during this virtual visit.    Subjective   Patient ID: Rocio Jenkisn is a 44 y.o. female who presents for Vaginal Discharge.    Vaginal Discharge  The patient's primary symptoms include vaginal discharge. This is a new problem. Episode onset: 3days ago. The problem occurs constantly. The problem has been gradually worsening. Associated symptoms comments: Itching, discharge,. The vaginal discharge was thick and malodorous. There has been no bleeding. She has not been passing tissue. Nothing aggravates the symptoms. She has tried nothing for the symptoms. The treatment provided no relief.        Review of Systems   Genitourinary:  Positive for vaginal discharge.       Objective   There were no vitals taken for this visit.    Physical Exam  Constitutional:       General: She is not in acute distress.     Appearance: Normal appearance. She is not ill-appearing.   Pulmonary:      Effort: Pulmonary effort is normal.   Neurological:      Mental Status: She is alert and oriented to person, place, and time.     All questions were answered and need for follow-up/in person care was reviewed.    Pt seen via video feed to be in no acute  distress  Reviewed use of otc/supportive care and sent via pt instruct       Assessment/Plan   Rocio was seen today for vaginal discharge.  Diagnoses and all orders for this visit:  Anogenital candidiasis in female  -     Discontinue: fluconazole (Diflucan) 150 mg tablet; Take 1 tablet (150 mg) by mouth 1 time for 1 dose.  -     fluconazole (Diflucan) 150 mg tablet; Take 1 tablet (150 mg) by mouth 1 time for 1 dose. Pt may repeat dosing in 72 hours is symptoms have not resolved.

## 2025-03-24 ENCOUNTER — OFFICE VISIT (OUTPATIENT)
Dept: URGENT CARE | Age: 45
End: 2025-03-24
Payer: COMMERCIAL

## 2025-03-24 VITALS
HEART RATE: 80 BPM | DIASTOLIC BLOOD PRESSURE: 90 MMHG | TEMPERATURE: 98 F | RESPIRATION RATE: 18 BRPM | SYSTOLIC BLOOD PRESSURE: 150 MMHG | OXYGEN SATURATION: 98 %

## 2025-03-24 DIAGNOSIS — B37.31 YEAST VAGINITIS: Primary | ICD-10-CM

## 2025-03-24 DIAGNOSIS — N89.8 VAGINAL DISCHARGE: ICD-10-CM

## 2025-03-24 PROCEDURE — 3080F DIAST BP >= 90 MM HG: CPT | Performed by: PHYSICIAN ASSISTANT

## 2025-03-24 PROCEDURE — 1036F TOBACCO NON-USER: CPT | Performed by: PHYSICIAN ASSISTANT

## 2025-03-24 PROCEDURE — G8433 SCR FOR DEP NOT CPT DOC RSN: HCPCS | Performed by: PHYSICIAN ASSISTANT

## 2025-03-24 PROCEDURE — 99213 OFFICE O/P EST LOW 20 MIN: CPT | Performed by: PHYSICIAN ASSISTANT

## 2025-03-24 PROCEDURE — 3077F SYST BP >= 140 MM HG: CPT | Performed by: PHYSICIAN ASSISTANT

## 2025-03-24 RX ORDER — FLUCONAZOLE 150 MG/1
150 TABLET ORAL SEE ADMIN INSTRUCTIONS
Qty: 2 TABLET | Refills: 1 | Status: SHIPPED | OUTPATIENT
Start: 2025-03-24 | End: 2025-03-25

## 2025-03-24 NOTE — PROGRESS NOTES
Subjective   Patient ID: Rocio Jenkins is a 44 y.o. female. They present today with a chief complaint of Vaginitis/Bacterial Vaginosis (White thick discharge x 3 days ).    History of Present Illness  Patient is a 44 -American female, no significant past medical history, presented to clinic with complaint of vaginal discharge.  Patient reporting 2 to 3-day history of whitish vaginal discharge with associated itching.  States she gets yeast infections quite frequently usually preceding her periods.  States she needs Diflucan.  She denies any concern for STDs.  No vaginal odor.  No genital lesions rashes or ulcerations.  No abdominal pain, nausea, vomiting.  No urinary complaints.  No further complaints at this time.          Past Medical History  Allergies as of 03/24/2025 - Reviewed 01/14/2025   Allergen Reaction Noted    Penicillins Rash, Hives, and Unknown 08/05/2005       (Not in a hospital admission)         Past Medical History:   Diagnosis Date    Eczema        Past Surgical History:   Procedure Laterality Date    OTHER SURGICAL HISTORY  07/11/2019    No history of surgery        reports that she has never smoked. She has never been exposed to tobacco smoke. She has never used smokeless tobacco. She reports that she does not currently use alcohol. She reports that she does not currently use drugs.    Review of Systems  Review of Systems                               Objective    Vitals:    03/24/25 1210   BP: 150/90   Pulse: 80   Resp: 18   Temp: 36.7 °C (98 °F)   SpO2: 98%     Patient's last menstrual period was 02/19/2025 (approximate).    Physical Exam  General: Vitals Noted. No distress. Normocephalic.     HEENT: TMs normal, EOMI, normal conjunctiva, patent nares, Normal OP    Neck: Supple with no adenopathy.     Cardiac: Regular Rate and Rhythm. No murmur.     Pulmonary: Equal breath sounds bilaterally. No wheezes, rhonchi, or rales.    Abdomen: Soft, non-tender, with normal bowel sounds.      Musculoskeletal: Moves all extremities, no effusion, no edema.     Skin: No obvious rashes.    Procedures    Point of Care Test & Imaging Results from this visit    No results found.    Diagnostic study results (if any) were reviewed by Frankie Devries PA-C.    Assessment/Plan   Allergies, medications, history, and pertinent labs/EKGs/Imaging reviewed by Frankie Devries PA-C.     Medical Decision Making  Patient was seen eval in the clinic with complaint of vaginal discharge.  On exam patient is nontoxic well-appearing respect comfortably no acute distress.  Vital signs are stable, afebrile.  Chest clear, regular, belly soft and nontender.  Patient is opted to self swab.  Will be sent for BV and yeast testing.  Provide Diflucan x 2 doses.  Vies about with her primary care physician next week.  Discharged home at this time.  Reviewed my impression, plan, strict return versus report to ED precautions with the patient.  She expresses understanding and agreement plan of care.    Orders and Diagnoses  Diagnoses and all orders for this visit:  Yeast vaginitis  -     fluconazole (Diflucan) 150 mg tablet; Take 1 tablet (150 mg) by mouth see administration instructions for 1 day. Take one tab now. Repeat in 3 days if symptoms persist.  Vaginal discharge  -     Vaginitis Gram Stain For Bacterial Vaginosis + Yeast        Medical Admin Record      Follow Up Instructions  No follow-ups on file.    Patient disposition: Home    Electronically signed by Frankie Devries PA-C  12:20 PM

## 2025-03-25 LAB — BV SCORE VAG QL: NORMAL

## 2025-07-03 ENCOUNTER — OFFICE VISIT (OUTPATIENT)
Dept: URGENT CARE | Age: 45
End: 2025-07-03
Payer: COMMERCIAL

## 2025-07-03 VITALS
WEIGHT: 218 LBS | DIASTOLIC BLOOD PRESSURE: 93 MMHG | BODY MASS INDEX: 38.63 KG/M2 | OXYGEN SATURATION: 99 % | TEMPERATURE: 98.2 F | SYSTOLIC BLOOD PRESSURE: 145 MMHG | RESPIRATION RATE: 16 BRPM | HEART RATE: 76 BPM

## 2025-07-03 DIAGNOSIS — R35.89 POLYURIA: Primary | ICD-10-CM

## 2025-07-03 LAB
POC APPEARANCE, URINE: CLEAR
POC BILIRUBIN, URINE: NEGATIVE
POC BLOOD, URINE: NEGATIVE
POC COLOR, URINE: YELLOW
POC FINGERSTICK BLOOD GLUCOSE: 114 MG/DL (ref 70–100)
POC GLUCOSE, URINE: NEGATIVE MG/DL
POC KETONES, URINE: NEGATIVE MG/DL
POC LEUKOCYTES, URINE: NEGATIVE
POC NITRITE,URINE: NEGATIVE
POC PH, URINE: 6 PH
POC PROTEIN, URINE: ABNORMAL MG/DL
POC SPECIFIC GRAVITY, URINE: >=1.03
POC UROBILINOGEN, URINE: 0.2 EU/DL
PREGNANCY TEST URINE, POC: NEGATIVE

## 2025-07-03 ASSESSMENT — ENCOUNTER SYMPTOMS
DYSURIA: 0
FREQUENCY: 1
DIFFICULTY URINATING: 0
HEMATURIA: 0
FLANK PAIN: 0

## 2025-07-03 NOTE — PROGRESS NOTES
Subjective   Patient ID: Rocio Jenkins is a 44 y.o. female. They present today with a chief complaint of Urinary Problem (Day 5-retention with frequency ).    History of Present Illness  HPI  Patient is a 44-year-old -American female presents today complaining of frequent urination for the past 5 days.  Patient describes delayed bladder emptying.  She denies any fever or chills.  She denies any vaginal discharge.   Past Medical History  Allergies as of 07/03/2025 - Reviewed 07/03/2025   Allergen Reaction Noted    Penicillins Rash, Hives, and Unknown 08/05/2005       Prescriptions Prior to Admission[1]     Medical History[2]    Surgical History[3]     reports that she has never smoked. She has never been exposed to tobacco smoke. She has never used smokeless tobacco. She reports that she does not currently use alcohol. She reports that she does not currently use drugs.    Review of Systems  Review of Systems   Genitourinary:  Positive for frequency. Negative for difficulty urinating, dyspareunia, dysuria, enuresis, flank pain, hematuria and pelvic pain.                                  Objective    Vitals:    07/03/25 1306   BP: (!) 145/93   Pulse: 76   Resp: 16   Temp: 36.8 °C (98.2 °F)   SpO2: 99%   Weight: 98.9 kg (218 lb)     Patient's last menstrual period was 06/21/2025.    Physical Exam  Patient is awake alert oriented x 3 no acute distress vital signs are stable.  She is afebrile.  Patient denies any abdominal pain no flank tenderness no guarding no rigidity no rebound.  Procedures    Point of Care Test & Imaging Results from this visit  Results for orders placed or performed in visit on 07/03/25   POCT UA Automated manually resulted   Result Value Ref Range    POC Color, Urine Yellow Straw, Yellow, Light-Yellow    POC Appearance, Urine Clear Clear    POC Glucose, Urine NEGATIVE NEGATIVE mg/dl    POC Bilirubin, Urine NEGATIVE NEGATIVE    POC Ketones, Urine NEGATIVE NEGATIVE mg/dl    POC Specific  Gravity, Urine >=1.030 1.005 - 1.035    POC Blood, Urine NEGATIVE NEGATIVE    POC PH, Urine 6.0 No Reference Range Established PH    POC Protein, Urine 15 (1+) (A) NEGATIVE mg/dl    POC Urobilinogen, Urine 0.2 0.2, 1.0 EU/DL    Poc Nitrite, Urine NEGATIVE NEGATIVE    POC Leukocytes, Urine NEGATIVE NEGATIVE      Imaging  No results found.    Cardiology, Vascular, and Other Imaging  No other imaging results found for the past 2 days      Diagnostic study results (if any) were reviewed by Joaquin Devries DO.    Assessment/Plan   Allergies, medications, history, and pertinent labs/EKGs/Imaging reviewed by Joaquin Devries DO.     Medical Decision Making  Patient was informed of her urinalysis result.  In light of the patient symptoms she was highly advised to follow-up with her family physician to undergo the pelvic ultrasound to rule out any masses that could be causing her symptoms.    Orders and Diagnoses  Diagnoses and all orders for this visit:  Polyuria  -     POCT UA Automated manually resulted  -     POCT pregnancy, urine manually resulted  -     POCT fingerstick glucose manually resulted      Medical Admin Record      Patient disposition: Home    Electronically signed by Joaquin Devries DO  1:33 PM           [1] (Not in a hospital admission)   [2]   Past Medical History:  Diagnosis Date    Eczema    [3]   Past Surgical History:  Procedure Laterality Date    OTHER SURGICAL HISTORY  07/11/2019    No history of surgery

## 2025-07-07 ENCOUNTER — TELEMEDICINE (OUTPATIENT)
Dept: PRIMARY CARE | Facility: CLINIC | Age: 45
End: 2025-07-07
Payer: COMMERCIAL

## 2025-07-07 DIAGNOSIS — B37.9 YEAST INFECTION: Primary | ICD-10-CM

## 2025-07-07 PROCEDURE — 99213 OFFICE O/P EST LOW 20 MIN: CPT | Performed by: PHYSICIAN ASSISTANT

## 2025-07-07 RX ORDER — FLUCONAZOLE 150 MG/1
TABLET ORAL
Qty: 2 TABLET | Refills: 0 | Status: SHIPPED | OUTPATIENT
Start: 2025-07-07

## 2025-07-07 NOTE — PROGRESS NOTES
"Subjective   Patient ID:   Rocio Jenkins is a 44 y.o. female who presents for yeast infection  Virtual or Telephone Consent    An interactive audio and video telecommunication system which permits real time communications between the patient (at the originating site) and provider (at the distant site) was utilized to provide this telehealth service.   Verbal consent was requested and obtained from Rocio Jenkins on this date, 07/07/25 for a telehealth visit and the patient's location was confirmed at the time of the visit.    HPI  Here with acute symptoms: Vaginal itching  Symptoms x \"a couple days\"    Patient denies any other symptoms.  Patient reports last yeast infection was several months ago.  She has no known hepatic condition per patient  Review of Systems  12 point review of systems negative unless stated above in HPI    There were no vitals filed for this visit.    Physical Exam  General: Alert and oriented, well nourished, no acute distress.  Lungs: non-labored respiration..  Neurologic: Awake, alert, and oriented X3, CN II-XII intact.  Psychiatric: Cooperative, appropriate mood and affect.    Assessment/Plan   Diagnoses and all orders for this visit:  Yeast infection  -     fluconazole (Diflucan) 150 mg tablet; Take 1 tablet by mouth. If no resolution after 72 hours, take 2nd tablet    I encouraged the patient to follow-up in person to an urgent care or primary care if no resolution in symptoms in 3 days or symptoms worsen.  Patient verbalized understanding with all questions answered    "

## 2025-09-05 ENCOUNTER — OFFICE VISIT (OUTPATIENT)
Dept: URGENT CARE | Age: 45
End: 2025-09-05
Payer: COMMERCIAL

## 2025-09-05 DIAGNOSIS — N89.8 VAGINAL DISCHARGE: Primary | ICD-10-CM

## 2025-09-05 RX ORDER — FLUCONAZOLE 150 MG/1
150 TABLET ORAL ONCE
Qty: 2 TABLET | Refills: 0 | Status: SHIPPED | OUTPATIENT
Start: 2025-09-05 | End: 2025-09-05